# Patient Record
Sex: MALE | Race: WHITE
[De-identification: names, ages, dates, MRNs, and addresses within clinical notes are randomized per-mention and may not be internally consistent; named-entity substitution may affect disease eponyms.]

---

## 2020-03-26 ENCOUNTER — HOSPITAL ENCOUNTER (EMERGENCY)
Dept: HOSPITAL 62 - ER | Age: 60
Discharge: ANOTHER HOSPITAL | End: 2020-03-26
Payer: SELF-PAY

## 2020-03-26 VITALS — SYSTOLIC BLOOD PRESSURE: 114 MMHG | DIASTOLIC BLOOD PRESSURE: 74 MMHG

## 2020-03-26 DIAGNOSIS — F17.200: ICD-10-CM

## 2020-03-26 DIAGNOSIS — R09.02: ICD-10-CM

## 2020-03-26 DIAGNOSIS — Z82.49: ICD-10-CM

## 2020-03-26 DIAGNOSIS — I11.0: ICD-10-CM

## 2020-03-26 DIAGNOSIS — R05: ICD-10-CM

## 2020-03-26 DIAGNOSIS — I21.4: Primary | ICD-10-CM

## 2020-03-26 DIAGNOSIS — I50.1: ICD-10-CM

## 2020-03-26 DIAGNOSIS — Z91.013: ICD-10-CM

## 2020-03-26 DIAGNOSIS — R06.02: ICD-10-CM

## 2020-03-26 DIAGNOSIS — I16.1: ICD-10-CM

## 2020-03-26 DIAGNOSIS — R53.81: ICD-10-CM

## 2020-03-26 DIAGNOSIS — R61: ICD-10-CM

## 2020-03-26 LAB
A TYPE INFLUENZA AG: NEGATIVE
ADD MANUAL DIFF: NO
ALBUMIN SERPL-MCNC: 4.3 G/DL (ref 3.5–5)
ALP SERPL-CCNC: 280 U/L (ref 38–126)
ANION GAP SERPL CALC-SCNC: 11 MMOL/L (ref 5–19)
APPEARANCE UR: CLEAR
APTT PPP: COLORLESS S
AST SERPL-CCNC: 308 U/L (ref 17–59)
B INFLUENZA AG: NEGATIVE
BASE EXCESS BLDV CALC-SCNC: -6.3 MMOL/L
BASOPHILS # BLD AUTO: 0.1 10^3/UL (ref 0–0.2)
BASOPHILS NFR BLD AUTO: 0.9 % (ref 0–2)
BILIRUB DIRECT SERPL-MCNC: 0.6 MG/DL (ref 0–0.4)
BILIRUB SERPL-MCNC: 0.8 MG/DL (ref 0.2–1.3)
BILIRUB UR QL STRIP: NEGATIVE
BUN SERPL-MCNC: 25 MG/DL (ref 7–20)
CALCIUM: 9.7 MG/DL (ref 8.4–10.2)
CHLORIDE SERPL-SCNC: 102 MMOL/L (ref 98–107)
CO2 SERPL-SCNC: 26 MMOL/L (ref 22–30)
EOSINOPHIL # BLD AUTO: 0.4 10^3/UL (ref 0–0.6)
EOSINOPHIL NFR BLD AUTO: 2.7 % (ref 0–6)
ERYTHROCYTE [DISTWIDTH] IN BLOOD BY AUTOMATED COUNT: 14.9 % (ref 11.5–14)
GLUCOSE SERPL-MCNC: 335 MG/DL (ref 75–110)
GLUCOSE UR STRIP-MCNC: >=500 MG/DL
HCO3 BLDV-SCNC: 21.4 MMOL/L (ref 20–32)
HCT VFR BLD CALC: 44.1 % (ref 37.9–51)
HGB BLD-MCNC: 14.1 G/DL (ref 13.5–17)
INR PPP: 1.11
KETONES UR STRIP-MCNC: (no result) MG/DL
LYMPHOCYTES # BLD AUTO: 2 10^3/UL (ref 0.5–4.7)
LYMPHOCYTES NFR BLD AUTO: 12.3 % (ref 13–45)
MCH RBC QN AUTO: 27.2 PG (ref 27–33.4)
MCHC RBC AUTO-ENTMCNC: 31.9 G/DL (ref 32–36)
MCV RBC AUTO: 85 FL (ref 80–97)
MONOCYTES # BLD AUTO: 0.9 10^3/UL (ref 0.1–1.4)
MONOCYTES NFR BLD AUTO: 5.5 % (ref 3–13)
NEUTROPHILS # BLD AUTO: 12.6 10^3/UL (ref 1.7–8.2)
NEUTS SEG NFR BLD AUTO: 78.6 % (ref 42–78)
NT PRO BNP: 2080 PG/ML (ref ?–125)
PCO2 BLDV: 49.9 MMHG (ref 35–63)
PH BLDV: 7.25 [PH] (ref 7.3–7.42)
PH UR STRIP: 5 [PH] (ref 5–9)
PLATELET # BLD: 304 10^3/UL (ref 150–450)
POTASSIUM SERPL-SCNC: 5.3 MMOL/L (ref 3.6–5)
PROT SERPL-MCNC: 7.8 G/DL (ref 6.3–8.2)
PROT UR STRIP-MCNC: NEGATIVE MG/DL
PROTHROMBIN TIME: 14.3 SEC (ref 11.4–15.4)
RBC # BLD AUTO: 5.18 10^6/UL (ref 4.35–5.55)
SP GR UR STRIP: 1
TOTAL CELLS COUNTED % (AUTO): 100 %
TROPONIN I SERPL-MCNC: 7.14 NG/ML
UROBILINOGEN UR-MCNC: NEGATIVE MG/DL (ref ?–2)
WBC # BLD AUTO: 16 10^3/UL (ref 4–10.5)

## 2020-03-26 PROCEDURE — 84484 ASSAY OF TROPONIN QUANT: CPT

## 2020-03-26 PROCEDURE — 96368 THER/DIAG CONCURRENT INF: CPT

## 2020-03-26 PROCEDURE — 96366 THER/PROPH/DIAG IV INF ADDON: CPT

## 2020-03-26 PROCEDURE — 85025 COMPLETE CBC W/AUTO DIFF WBC: CPT

## 2020-03-26 PROCEDURE — 87040 BLOOD CULTURE FOR BACTERIA: CPT

## 2020-03-26 PROCEDURE — 82803 BLOOD GASES ANY COMBINATION: CPT

## 2020-03-26 PROCEDURE — 99291 CRITICAL CARE FIRST HOUR: CPT

## 2020-03-26 PROCEDURE — 85610 PROTHROMBIN TIME: CPT

## 2020-03-26 PROCEDURE — 87804 INFLUENZA ASSAY W/OPTIC: CPT

## 2020-03-26 PROCEDURE — 81001 URINALYSIS AUTO W/SCOPE: CPT

## 2020-03-26 PROCEDURE — 80053 COMPREHEN METABOLIC PANEL: CPT

## 2020-03-26 PROCEDURE — 83605 ASSAY OF LACTIC ACID: CPT

## 2020-03-26 PROCEDURE — 87150 DNA/RNA AMPLIFIED PROBE: CPT

## 2020-03-26 PROCEDURE — 87186 SC STD MICRODIL/AGAR DIL: CPT

## 2020-03-26 PROCEDURE — 83880 ASSAY OF NATRIURETIC PEPTIDE: CPT

## 2020-03-26 PROCEDURE — 87077 CULTURE AEROBIC IDENTIFY: CPT

## 2020-03-26 PROCEDURE — 36415 COLL VENOUS BLD VENIPUNCTURE: CPT

## 2020-03-26 PROCEDURE — 93005 ELECTROCARDIOGRAM TRACING: CPT

## 2020-03-26 PROCEDURE — 85730 THROMBOPLASTIN TIME PARTIAL: CPT

## 2020-03-26 PROCEDURE — 71045 X-RAY EXAM CHEST 1 VIEW: CPT

## 2020-03-26 PROCEDURE — 93010 ELECTROCARDIOGRAM REPORT: CPT

## 2020-03-26 PROCEDURE — 96365 THER/PROPH/DIAG IV INF INIT: CPT

## 2020-03-26 PROCEDURE — 96376 TX/PRO/DX INJ SAME DRUG ADON: CPT

## 2020-03-26 PROCEDURE — 96375 TX/PRO/DX INJ NEW DRUG ADDON: CPT

## 2020-03-26 NOTE — ER DOCUMENT REPORT
Doctor's Note


Notes: 





03/26/20 08:21


Transport is here to take the patient to [facility].  At this time his vital signs 

are stable with a blood pressure 114/74, pulse ox 99%, and heart rate 73.  He is

in no distress at this time.  He is stable for transfer.

## 2020-03-26 NOTE — RADIOLOGY REPORT (SQ)
Chest one view on  3/26/2020 at 1:29 AM 



CLINICAL INDICATION: Cough, shortness of breath, hypoxia,

question COVID



COMPARISON: None



FINDINGS: There are mild increased reticular interstitial

opacities that may be chronic in nature versus mild edema. Lungs

are otherwise clear. Cardiac, hilar and mediastinal contours are

within normal limits. No bony normality is noted.



IMPRESSION: Mild increased reticular interstitial changes that

may be chronic in nature versus mild edema. No pulmonary

opacities are identified that are worrisome for definite viral

infection. Please note that chest radiographs have low

sensitivity for subtle groundglass opacities.

## 2020-03-26 NOTE — ER DOCUMENT REPORT
ED General





- General


Chief Complaint: Breathing Difficulty


Stated Complaint: DIFFICULTY BREATHING


Primary Care Provider: 


JEFFERY ECHAVARRIA MD [ACTIVE STAFF] - Follow up as needed


Notes: 





59-year-old male presents to the emergency department complaining of sudden 

onset of shortness of breath.  Patient states that this started about 3 days 

ago, states that he took a warm shower and was able to cough up a large glob of 

phlegm and then felt much better.  Admits to sweating, denies chills or fevers. 

States that the shortness of breath started again suddenly and worse just prior 

to arrival.  States that he feels like he has a bunch of fluid and/or phlegm in 

his chest and if he could just get it up he would feel better.  Denies chest 

pain.  Denies any travel, denies leaving his house, states he has been staying 

in his house like he supposed to.  Denies any sick contacts.  Admits smoking.  

Denies any history of congestive heart failure.


TRAVEL OUTSIDE OF THE [place] IN LAST 30 DAYS: No





- Related Data


Allergies/Adverse Reactions: 


                                        





Shellfish * [Shellfish] Allergy (Verified 03/21/15 20:59)


   


codeine [Codeine] Adverse Reaction (Severe, Verified 03/21/15 21:00)


   VOMITING











Past Medical History





- General


Information source: Patient





- Social History


Smoking Status: Current Every Day Smoker


Frequency of alcohol use: None


Drug Abuse: None


Family History: Other - Congestive heart failure.





- Immunizations


Hx Diphtheria, Pertussis, Tetanus Vaccination: No





Review of Systems





- Review of Systems


Constitutional: See HPI, Diaphoresis, Malaise.  denies: Chills, Fever


EENT: No symptoms reported


Cardiovascular: See HPI, Dyspnea.  denies: Chest pain, Palpitations


Respiratory: See HPI, Cough, Short of breath, Sputum.  denies: Hurts to breathe


-: Yes All other systems reviewed and negative





Physical Exam





- Vital signs


Vitals: 


                                        











Resp Pulse Ox


 


 31 H  100 


 


 03/26/20 00:45  03/26/20 00:45














- Notes


Notes: 





GENERAL: Sitting straight up in bed, short of breath, tachypneic, diaphoretic, a

ppears very short of breath and uncomfortable.


HEAD: Normocephalic, atraumatic


EYES: Pupils equal, round and reactive to light, extraocular movements intact.


ENT: Oral mucosa moist, tongue midline. 


NECK: Full range of motion, supple, trachea midline.


LUNGS: Initially minimal air movement, sitting up, tripoding, using accessory 

muscles of respiration, able to speak in 2-3 word sentences, after using MDI 

with spacer for 4 puffs patient is having significantly better air movement, 

rhonchi in the right lower lobe, no wheezing though he is still tripoding and 

using accessory muscles of respiration.  Acutely in respiratory distress.  


HEART: Tachycardic rate and rhythm, no murmurs, gallops, rubs.  


ABDOMEN: Soft, nontender, nondistended, bowel sounds present in all 4 quadrants.

  


EXTREMITIES: Moves all 4 extremities spontaneously, no edema, radial and 

dorsalis pedis pulses 2/4 bilaterally.  No cyanosis.  


NEUROLOGICAL: Alert and oriented x3, normal speech.


PSYCH: Normal mood, normal affect.


SKIN: Warm, profusely diaphoretic, no edema.





Course





- Re-evaluation


Re-evalutation: 





03/26/20 02:20


On arrival patient was acutely short of breath, quite concerning for possible 

COVID 19, patient was hypoxic and placed on nonrebreather at 15 L, able to be 

titrated down to 10 L.  Patient was then given albuterol using an inhaler and 

spacer 4 puffs x 2, this did significantly relieve a large amount of his 

shortness of breath, left some rhonchi in the right lower lobe.  Patient had 

chest x-ray performed that showed possible mild pulmonary edema, EKG actually 

showed diffuse ST segment depression concerning for ischemia.  Patient given 

nitroglycerin which did improve his shortness of breath.  Patient has never had 

any chest pain with this.  EKG is mildly improved after nitroglycerin.  Patient 

is now down to 6 L via nasal cannula and is 96%.  Resting in bed quite 

comfortably, lying on his side and occasionally lying on his abdomen.





CBC shows leukocytosis at 16.0, INR slightly prolonged, VBG shows acidosis with 

a pH of 7.25 which goes along with his market tachypnea, CMP shows slightly 

elevated potassium at 5.3, glucose elevated at 335, lactic elevated at 2.6, AST 

elevated at 308, ALT elevated at 195, alkaline phosphatase elevated at 280.  

Troponin is positive at 7.14, proBNP elevated at 2080.  Flu swabs are negative.


03/26/20 02:20


Patient was initially started on Levaquin and Solu-Medrol based off of physical 

examination, hypoxia and rhonchi in the right lower lobe concerning for possible

 right lower lobe pneumonia.  After getting back the chest x-ray, EKG and 

troponin I feel this is unlikely to represent pneumonia after all.





Patient will be started on heparin.  Discussed with patient that he needs to be 

transferred to a more advanced cardiac center, patient request transfer to 

[facility].


03/26/20 02:29


Given rapid improvement, positive troponin and EKG changes and now feel this is 

very unlikely to be COVID 19.  Testing will be canceled and droplet precautions 

will be removed.


03/26/20 02:33


Patient has been accepted by the [facility] transfer line coordinator

 on behalf of Dr. Phu Wasserman.


03/26/20 03:46


Cardiology fellow Dr. Liu called back from [facility], discussed

 the patient with me, agrees with current treatment.





- Vital Signs


Vital signs: 


                                        











Temp Pulse Resp BP Pulse Ox


 


 97.7 F   92   23 H  126/67 H  97 


 


 03/26/20 02:34  03/26/20 02:34  03/26/20 02:34  03/26/20 02:34  03/26/20 03:00














- Laboratory


Result Diagrams: 


                                 03/26/20 00:55





                                 03/26/20 00:55


Laboratory results interpreted by me: 


                                        











  03/26/20 03/26/20 03/26/20





  00:55 00:55 00:55


 


WBC  16.0 H  


 


MCHC  31.9 L  


 


RDW  14.9 H  


 


Lymph % (Auto)  12.3 L  


 


Absolute Neuts (auto)  12.6 H  


 


Seg Neutrophils %  78.6 H  


 


VBG pH    7.25 L


 


Potassium   5.3 H 


 


BUN   25 H 


 


Glucose   335 H 


 


Lactic Acid   


 


Direct Bilirubin   0.6 H 


 


AST   308 H 


 


ALT   195 H 


 


Alkaline Phosphatase   280 H 


 


NT-Pro-B Natriuret Pep   














  03/26/20 03/26/20





  00:55 00:55


 


WBC  


 


MCHC  


 


RDW  


 


Lymph % (Auto)  


 


Absolute Neuts (auto)  


 


Seg Neutrophils %  


 


VBG pH  


 


Potassium  


 


BUN  


 


Glucose  


 


Lactic Acid   2.6 H


 


Direct Bilirubin  


 


AST  


 


ALT  


 


Alkaline Phosphatase  


 


NT-Pro-B Natriuret Pep  2080 H 














- EKG Interpretation by Me


Additional EKG results interpreted by me: 





03/26/20 01:27


EKG shows sinus tachycardia at a rate of 106, ST segment depressions in 1, aVL, 

V4 through V6, 1 mm ST segment elevations in aVR and aVF, no other ST segment 

elevations noted, T wave inversions in 1 and aVL, normal axis, normal intervals 

per my interpretation.


03/26/20 02:22


Repeat EKG shows sinus rhythm at a rate of 93, normal axis, normal intervals, 

improved ST segment depressions in 1 and aVL, worsened ST segment depressions in

 V4, improved ST segment depressions in V5 and V6, still no ST segment 

elevations per my interpretation.





Critical Care Note





- Critical Care Note


Total time excluding time spent on procedures (mins): 55





Discharge





- Discharge


Clinical Impression: 


 NSTEMI (non-ST elevated myocardial infarction), Flash pulmonary edema, 

Hypertensive emergency





Condition: Fair


Disposition: [facility]


Referrals: 


JEFFERY ECHAVARRIA MD [ACTIVE STAFF] - Follow up as needed

## 2020-03-26 NOTE — EKG REPORT
SEVERITY:- ABNORMAL ECG -

SINUS TACHYCARDIA

PROBABLE LEFT ATRIAL ABNORMALITY

INFERIOR INFARCT, AGE INDETERMINATE

CONSIDER ANTERIOR INFARCT

REPOL ABNRM SUGGESTS ISCHEMIA, DIFFUSE LEADS

ER CALLED, ALREADY TRANSFERRED TO [facility]

:

Confirmed by: Anjum Sandhu MD 26-Mar-2020 08:28:50

## 2020-03-26 NOTE — EKG REPORT
SEVERITY:- ABNORMAL ECG -

SINUS RHYTHM

LEFT ATRIAL ABNORMALITY

INFERIOR INFARCT, AGE INDETERMINATE

REPOL ABNRM SUGGESTS ISCHEMIA, DIFFUSE LEADS

:

Confirmed by: Anjum Sandhu MD 26-Mar-2020 08:25:11

## 2020-04-07 ENCOUNTER — HOSPITAL ENCOUNTER (EMERGENCY)
Dept: HOSPITAL 62 - ER | Age: 60
LOS: 1 days | Discharge: HOME | End: 2020-04-08
Payer: SELF-PAY

## 2020-04-07 DIAGNOSIS — I11.0: ICD-10-CM

## 2020-04-07 DIAGNOSIS — R07.89: Primary | ICD-10-CM

## 2020-04-07 DIAGNOSIS — Z79.4: ICD-10-CM

## 2020-04-07 DIAGNOSIS — Z95.5: ICD-10-CM

## 2020-04-07 DIAGNOSIS — Z79.899: ICD-10-CM

## 2020-04-07 DIAGNOSIS — E11.65: ICD-10-CM

## 2020-04-07 DIAGNOSIS — Z91.013: ICD-10-CM

## 2020-04-07 DIAGNOSIS — I50.9: ICD-10-CM

## 2020-04-07 PROCEDURE — 93005 ELECTROCARDIOGRAM TRACING: CPT

## 2020-04-07 PROCEDURE — 96372 THER/PROPH/DIAG INJ SC/IM: CPT

## 2020-04-07 PROCEDURE — 82962 GLUCOSE BLOOD TEST: CPT

## 2020-04-07 PROCEDURE — 99285 EMERGENCY DEPT VISIT HI MDM: CPT

## 2020-04-07 PROCEDURE — 93010 ELECTROCARDIOGRAM REPORT: CPT
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
## 2020-04-08 VITALS — SYSTOLIC BLOOD PRESSURE: 132 MMHG | DIASTOLIC BLOOD PRESSURE: 86 MMHG

## 2020-04-08 NOTE — EKG REPORT
SEVERITY:- ABNORMAL ECG -

SINUS RHYTHM

VENTRICULAR PREMATURE COMPLEX

PROBABLE LEFT ATRIAL ABNORMALITY

INFERIOR INFARCT, OLD

LATERAL LEADS ARE ALSO INVOLVED

:

Confirmed by: Elizabeth Wood MD 08-Apr-2020 10:06:43

## 2020-10-09 ENCOUNTER — HOSPITAL ENCOUNTER (EMERGENCY)
Dept: HOSPITAL 62 - ER | Age: 60
Discharge: HOME | End: 2020-10-09
Payer: MEDICAID

## 2020-10-09 VITALS — DIASTOLIC BLOOD PRESSURE: 72 MMHG | SYSTOLIC BLOOD PRESSURE: 166 MMHG

## 2020-10-09 DIAGNOSIS — Z95.1: ICD-10-CM

## 2020-10-09 DIAGNOSIS — I50.9: ICD-10-CM

## 2020-10-09 DIAGNOSIS — I11.0: ICD-10-CM

## 2020-10-09 DIAGNOSIS — S05.01XA: Primary | ICD-10-CM

## 2020-10-09 DIAGNOSIS — H57.11: ICD-10-CM
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
## 2020-10-09 DIAGNOSIS — E11.9: ICD-10-CM

## 2020-10-09 PROCEDURE — 99283 EMERGENCY DEPT VISIT LOW MDM: CPT

## 2020-10-09 NOTE — ER DOCUMENT REPORT
HPI





- HPI


Patient complains to provider of: Eye pain


Time Seen by Provider: 10/09/20 16:35


Onset: Other - 2 days


Onset/Duration: Persistent


Quality of pain: Achy


Pain Level: 2


Context: 





Patient states he woke up 2 days ago and felt like something was in his eye.  

Patient called EMS and they did irrigate his eye.  Patient complains of 

continued right eye discomfort.  Patient denies any change in vision.  Patient 

denies any pain with eye movement.  Patient does report light sensitivity and 

tearing.


Associated Symptoms: Other - Right eye light sensitivity


Exacerbated by: Denies


Relieved by: Denies


Similar symptoms previously: No


Recently seen / treated by doctor: No





- ROS


ROS below otherwise negative: Yes


Systems Reviewed and Negative: Yes All other systems reviewed and negative





- EENT


EENT: REPORTS: Eye problems - right eye





- GASTROINTESTINAL


Gastrointestinal: DENIES: Nausea





- DERM


Skin Color: Normal


Skin Problems: None





Past Medical History





- General


Information source: Patient





- Social History


Smoking Status: Never Smoker


Chew tobacco use (# tins/day): Yes


Frequency of alcohol use: None


Drug Abuse: None


Family History: Reviewed & Not Pertinent, Other - Congestive heart failure.


Patient has homicidal ideation: No





- Past Medical History


Cardiac Medical History: Reports: Hx Congestive Heart Failure, Hx Hypertension


Endocrine Medical History: Reports: Hx Diabetes Mellitus Type 2


Past Surgical History: Reports: Hx Cardiac Surgery - triple bypass 2020





- Immunizations


Hx Diphtheria, Pertussis, Tetanus Vaccination: No





Vertical Provider Document





- CONSTITUTIONAL


Agree With Documented VS: Yes


Exam Limitations: No Limitations


General Appearance: WD/WN, No Apparent Distress





- INFECTION CONTROL


TRAVEL OUTSIDE OF THE [place] IN LAST 30 DAYS: No





- HEENT


HEENT: Atraumatic, Normocephalic


Notes: 





Patient with corneal abrasion to the right eye, no corneal ulcer, foreign body 

or dendritic lesion.  Patient with fluorescein uptake during examination.  Lid 

everted, no foreign body noted.  +tearing, light sensitivity.  No pain with eye 

movement, no tenderness with palpation over the eyelid.





- NECK


Neck: Normal Inspection





- RESPIRATORY


Respiratory: Breath Sounds Normal, No Respiratory Distress





- CARDIOVASCULAR


Cardiovascular: Regular Rate, Regular Rhythm





- MUSCULOSKELETAL/EXTREMETIES


Musculoskeletal/Extremeties: MAEW





- NEURO


Level of Consciousness: Awake, Alert, Appropriate


Motor/Sensory: No Motor Deficit





- DERM


Integumentary: Warm, Dry, No Rash





Course





- Re-evaluation


Re-evalutation: 





10/09/20 18:03


Consulted with Dr. Chapin, Dr. Chapin to bedside for examination.  Agrees 

with plan for treatment of corneal abrasion with topical antibiotic ointment and

outpatient follow-up with ophthalmology.





- Vital Signs


Vital signs: 


                                        











Temp Pulse Resp BP Pulse Ox


 


 98.1 F   95   18   166/72 H  98 


 


 10/09/20 16:35  10/09/20 16:28  10/09/20 16:28  10/09/20 16:28  10/09/20 16:28














Procedures





- Eye Procedure


  ** Right


Fluorescein applied: Right


Slit lamp used: Yes


Eyes picture: 


                            __________________________














                            __________________________





 1 - + Corneal abrasion, no foreign body, no ulcer no dendritic lesion








Discharge





- Discharge


Clinical Impression: 


Corneal abrasion, right


Qualifiers:


 Encounter type: initial encounter Qualified Code(s): S05.01XA - Injury of 

conjunctiva and corneal abrasion without foreign body, right eye, initial 

encounter





Condition: Stable


Disposition: HOME, SELF-CARE


Instructions:  Antibiotic Therapy ([facility]), Corneal Abrasion ([facility])


Additional Instructions: 


Return immediately for any new or worsening symptoms





Followup with your primary care provider, call tomorrow to make a followup 

appointment





Instill 1 inch ribbon of the erythromycin ointment to the right eye 4 times a 

day for the next week.





Your primary doctor can make a referral to ophthalmology.  Follow-up with 

ophthalmologist for further evaluation.


Referrals: 


[facility] [Provider Group] - 10/12/20


[facility] [Provider Group] - 10/12/20